# Patient Record
Sex: MALE | Race: WHITE | Employment: OTHER | URBAN - METROPOLITAN AREA
[De-identification: names, ages, dates, MRNs, and addresses within clinical notes are randomized per-mention and may not be internally consistent; named-entity substitution may affect disease eponyms.]

---

## 2022-08-01 ENCOUNTER — TRANSCRIBE ORDER (OUTPATIENT)
Dept: SCHEDULING | Age: 87
End: 2022-08-01

## 2022-08-01 DIAGNOSIS — R13.13 PHARYNGEAL DYSPHAGIA: Primary | ICD-10-CM

## 2022-08-05 ENCOUNTER — HOSPITAL ENCOUNTER (OUTPATIENT)
Dept: GENERAL RADIOLOGY | Age: 87
Discharge: HOME | End: 2022-08-05
Attending: FAMILY MEDICINE
Payer: MEDICARE

## 2022-08-05 DIAGNOSIS — R13.13 PHARYNGEAL DYSPHAGIA: ICD-10-CM

## 2022-08-05 PROCEDURE — 74230 X-RAY XM SWLNG FUNCJ C+: CPT

## 2022-08-05 PROCEDURE — 92611 MOTION FLUOROSCOPY/SWALLOW: CPT

## 2022-08-05 NOTE — PROGRESS NOTES
[facility]  [facility], [address] STUDY  Patient: Callie Davison (14 y.o. male)  Date: 8/5/2022  Referring Provider:  Zenobia Munguia DO    SUBJECTIVE:   Patient was accompanied by daughter who provided case history. Patient was hospitalized in [place] for 1 week for aspiration pneumonia. Instrumental swallowing evaluation reportedly not completed, however patient was put on moderately/honey thick liquids at bedside. Patient discharged to rehab and instrumental swallowing evaluation reportedly still not completed. [facility] advocated for [facility]BS. Patient eats soft solids with no difficulty per daughter reported. Takes medications in applesauce or thickened liquids. Daughter will give patient a small amount of thin water at night as he does not like thickened liquids, and he coughs a lot with thin liquids. PMH includes hiatal hernia, however patient generally healthy with last hospitalization 20+ years ago prior to aspiration pneumonia hospitalization. OBJECTIVE:   Past Medical History:   No past medical history on file. No past surgical history on file. Current Dietary Status:  soft solids with moderately/honey thick liquids  Radiologist: AALIYAH Hua  Film Views: Lateral;Fluoro  Patient Position: upright in hausted chair    Trial 1:   Consistency Presented: Thin liquid; Nectar thick liquid;Honey thick liquid;Puree; Solid   How Presented: Self-fed/presented;Cup/sip;Spoon       Bolus Acceptance: No impairment   Bolus Formation/Control: No impairment:     Propulsion: No impairment       Initiation of Swallow: Triggered at vallecula;Triggered at pyriform sinus(es)   Timing: Pooling 1-5 sec   Penetration: During swallow;From initial swallow;From residual   Aspiration/Timing: Repeated;During;From initial swallow;From residual;Other (comment) (silent vs audible)   Pharyngeal Clearance: Vallecular residue;Pyriform residue ;Greater than 50%   Attempted Modifications: Small sips and bites; Alternate liquids/solids;Cup/sip;Spoon   Effective Modifications: None   Cues for Modifications: Minimal           Decreased Tongue Base Retraction?: Yes  Laryngeal Elevation: Inadequate epiglottic inversion; Incomplete laryngeal closure; Reduced excursion with laryngeal vestibule gap  Aspiration/Penetration Score: 8 (Aspiration-Contrast passes cords/glottis with no effort to eject, ie/silent aspiration)  Pharyngeal Symmetry: Not assessed          Oral Phase Severity: Other (comment) (WFL)  Pharyngeal Phase Severity: Severe    The NOMS functional outcome measure was used to quantify this patient's level of swallowing impairment. Based on the NOMS, the patient was determined to be at level 2 for swallow function         NOMS Swallowing Levels:  Level 1 (CN): NPO  Level 2 (CM): NPO but takes consistency in therapy  Level 3 (CL): Takes less than 50% of nutrition p.o. and continues with nonoral feedings; and/or safe with mod cues; and/or max diet restriction  Level 4 (CK): Safe swallow but needs mod cues; and/or mod diet restriction; and/or still requires some nonoral feeding/supplements  Level 5 (CJ): Safe swallow with min diet restriction; and/or needs min cues  Level 6 (CI): Independent with p.o.; rare cues; usually self cues; may need to avoid some foods or needs extra time  Level 7 ([address] [place] [address]): Independent for all p.o.  PRISCILA. (2003). National Outcomes Measurement System (NOMS): Adult Speech-Language Pathology User's Guide. ASSESSMENT :  Based on the objective data described above, the patient presents with severe pharyngeal dysphagia.  Patient with reduced movement of all pharyngeal structures, including reduced tongue base retraction, epiglottic inversion, anterior hyoid excursion, laryngeal elevation/closure, and UES opening resulting in collection of vallecular and pyriform sinus residue with all liquids and max residue with puree and solid. Patient with aspiration of all consistencies, including thin liquids, mildly thick liquids, moderately thick liquids, and residue from both puree and solid, during the swallow secondary to reduced laryngeal elevation/closure or during subsequent swallows due to residue. Aspiration sometimes elicited spontaneous cough that was ineffective to clear aspirate, however aspiration was also intermittently silent. Provided education regarding results as above to patient's daughter after MBS completed. Daughter reported that patient does have an AMD that says no feeding tubes. Given patient does not want a feeding tube (which is appropriate for advanced age and guarded prognosis for recovery of swallow function) and aspirates all consistencies, would recommend patient STOP using thickener and drink thin liquids. Also recommended daughter follow up with patient's PCP as soon as possible. Called patient's PCP, Dr. Huertas, and patient's [facility] SLP, Edil Nicolas, to discuss MBS results. PLAN/RECOMMENDATIONS :  -PEG is not in line with patient's goals per daughter report of patient's AMD  -Thickener is still aspirated and thus not safer for patient, therefore recommend resume thin liquids, or whatever patient desires  -Additional education/discussion likely required regarding goals of care, quality of life, aspiration risk, oral care; defer to PCP and treating SLP     COMMUNICATION/EDUCATION:   The above findings and recommendations were discussed with:  patient's daughter  who verbalized understanding.     Thank you for this referral.  Kevan Guevara, SLP  Time Calculation: 28 mins

## 2022-11-09 ENCOUNTER — HOSPITAL ENCOUNTER (INPATIENT)
Age: 87
LOS: 1 days | Discharge: HOSPICE | DRG: 812 | End: 2022-11-10
Attending: EMERGENCY MEDICINE | Admitting: HOSPITALIST
Payer: MEDICARE

## 2022-11-09 ENCOUNTER — APPOINTMENT (OUTPATIENT)
Dept: ULTRASOUND IMAGING | Age: 87
DRG: 812 | End: 2022-11-09
Attending: HOSPITALIST
Payer: MEDICARE

## 2022-11-09 ENCOUNTER — APPOINTMENT (OUTPATIENT)
Dept: GENERAL RADIOLOGY | Age: 87
DRG: 812 | End: 2022-11-09
Attending: EMERGENCY MEDICINE
Payer: MEDICARE

## 2022-11-09 DIAGNOSIS — R05.1 ACUTE COUGH: ICD-10-CM

## 2022-11-09 DIAGNOSIS — T17.908A ASPIRATION INTO AIRWAY, INITIAL ENCOUNTER: ICD-10-CM

## 2022-11-09 DIAGNOSIS — D64.9 SYMPTOMATIC ANEMIA: Primary | ICD-10-CM

## 2022-11-09 DIAGNOSIS — K92.2 GASTROINTESTINAL HEMORRHAGE, UNSPECIFIED GASTROINTESTINAL HEMORRHAGE TYPE: ICD-10-CM

## 2022-11-09 DIAGNOSIS — N18.9 CHRONIC KIDNEY DISEASE, UNSPECIFIED CKD STAGE: ICD-10-CM

## 2022-11-09 PROBLEM — R79.89 ELEVATED SERUM CREATININE: Status: ACTIVE | Noted: 2022-11-09

## 2022-11-09 PROBLEM — R79.9 ELEVATED BUN: Status: ACTIVE | Noted: 2022-11-09

## 2022-11-09 PROBLEM — R06.00 DYSPNEA: Status: ACTIVE | Noted: 2022-11-09

## 2022-11-09 PROBLEM — N17.9 AKI (ACUTE KIDNEY INJURY) (HCC): Status: ACTIVE | Noted: 2022-11-09

## 2022-11-09 PROBLEM — R06.00 DYSPNEA: Status: RESOLVED | Noted: 2022-11-09 | Resolved: 2022-11-09

## 2022-11-09 PROBLEM — R53.1 GENERALIZED WEAKNESS: Status: ACTIVE | Noted: 2022-11-09

## 2022-11-09 PROBLEM — R06.89 ACUTE RESPIRATORY INSUFFICIENCY: Status: ACTIVE | Noted: 2022-11-09

## 2022-11-09 PROBLEM — R53.83 FATIGUE: Status: ACTIVE | Noted: 2022-11-09

## 2022-11-09 LAB
25(OH)D3 SERPL-MCNC: 83.2 NG/ML (ref 30–100)
ALBUMIN SERPL-MCNC: 3.3 G/DL (ref 3.5–5)
ALBUMIN/GLOB SERPL: 0.8 {RATIO} (ref 1.1–2.2)
ALP SERPL-CCNC: 79 U/L (ref 45–117)
ALT SERPL-CCNC: 18 U/L (ref 12–78)
ANION GAP SERPL CALC-SCNC: 13 MMOL/L (ref 5–15)
APTT PPP: 22.3 SEC (ref 22.1–31)
AST SERPL-CCNC: 17 U/L (ref 15–37)
BASOPHILS # BLD: 0 K/UL (ref 0–0.1)
BASOPHILS NFR BLD: 0 % (ref 0–1)
BILIRUB SERPL-MCNC: 0.3 MG/DL (ref 0.2–1)
BNP SERPL-MCNC: 742 PG/ML (ref 0–450)
BUN SERPL-MCNC: 111 MG/DL (ref 6–20)
BUN/CREAT SERPL: 50 (ref 12–20)
CALCIUM SERPL-MCNC: 8.6 MG/DL (ref 8.5–10.1)
CHLORIDE SERPL-SCNC: 105 MMOL/L (ref 97–108)
CO2 SERPL-SCNC: 21 MMOL/L (ref 21–32)
CREAT SERPL-MCNC: 2.2 MG/DL (ref 0.7–1.3)
DIFFERENTIAL METHOD BLD: ABNORMAL
EOSINOPHIL # BLD: 0 K/UL (ref 0–0.4)
EOSINOPHIL NFR BLD: 0 % (ref 0–7)
ERYTHROCYTE [DISTWIDTH] IN BLOOD BY AUTOMATED COUNT: 13.1 % (ref 11.5–14.5)
FERRITIN SERPL-MCNC: 63 NG/ML (ref 26–388)
FOLATE SERPL-MCNC: 22.4 NG/ML (ref 5–21)
GLOBULIN SER CALC-MCNC: 4.1 G/DL (ref 2–4)
GLUCOSE SERPL-MCNC: 132 MG/DL (ref 65–100)
HAPTOGLOB SERPL-MCNC: 153 MG/DL (ref 30–200)
HCT VFR BLD AUTO: 18 % (ref 36.6–50.3)
HEMOCCULT STL QL: POSITIVE
HGB BLD-MCNC: 5.9 G/DL (ref 12.1–17)
HISTORY CHECKED?,CKHIST: NORMAL
IMM GRANULOCYTES # BLD AUTO: 0.1 K/UL (ref 0–0.04)
IMM GRANULOCYTES NFR BLD AUTO: 1 % (ref 0–0.5)
INR PPP: 1 (ref 0.9–1.1)
IRON SATN MFR SERPL: 10 % (ref 20–50)
IRON SERPL-MCNC: 34 UG/DL (ref 35–150)
LACTATE SERPL-SCNC: 1.5 MMOL/L (ref 0.4–2)
LYMPHOCYTES # BLD: 1.2 K/UL (ref 0.8–3.5)
LYMPHOCYTES NFR BLD: 11 % (ref 12–49)
MAGNESIUM SERPL-MCNC: 2 MG/DL (ref 1.6–2.4)
MCH RBC QN AUTO: 33.5 PG (ref 26–34)
MCHC RBC AUTO-ENTMCNC: 32.8 G/DL (ref 30–36.5)
MCV RBC AUTO: 102.3 FL (ref 80–99)
MONOCYTES # BLD: 0.7 K/UL (ref 0–1)
MONOCYTES NFR BLD: 7 % (ref 5–13)
NEUTS SEG # BLD: 9 K/UL (ref 1.8–8)
NEUTS SEG NFR BLD: 82 % (ref 32–75)
NRBC # BLD: 0 K/UL (ref 0–0.01)
NRBC BLD-RTO: 0 PER 100 WBC
PHOSPHATE SERPL-MCNC: 4.4 MG/DL (ref 2.6–4.7)
PLATELET # BLD AUTO: 231 K/UL (ref 150–400)
PMV BLD AUTO: 11.6 FL (ref 8.9–12.9)
POTASSIUM SERPL-SCNC: 4.9 MMOL/L (ref 3.5–5.1)
PROCALCITONIN SERPL-MCNC: 0.12 NG/ML
PROT SERPL-MCNC: 7.4 G/DL (ref 6.4–8.2)
PROTHROMBIN TIME: 9.9 SEC (ref 9–11.1)
RBC # BLD AUTO: 1.76 M/UL (ref 4.1–5.7)
RETICS # AUTO: 0.05 M/UL (ref 0.03–0.1)
RETICS/RBC NFR AUTO: 3 % (ref 0.7–2.1)
SODIUM SERPL-SCNC: 139 MMOL/L (ref 136–145)
THERAPEUTIC RANGE,PTTT: NORMAL SECS (ref 58–77)
TIBC SERPL-MCNC: 325 UG/DL (ref 250–450)
TROPONIN-HIGH SENSITIVITY: 21 NG/L (ref 0–76)
VIT B12 SERPL-MCNC: 457 PG/ML (ref 193–986)
WBC # BLD AUTO: 11 K/UL (ref 4.1–11.1)

## 2022-11-09 PROCEDURE — 83540 ASSAY OF IRON: CPT

## 2022-11-09 PROCEDURE — 82607 VITAMIN B-12: CPT

## 2022-11-09 PROCEDURE — 76770 US EXAM ABDO BACK WALL COMP: CPT

## 2022-11-09 PROCEDURE — 36415 COLL VENOUS BLD VENIPUNCTURE: CPT

## 2022-11-09 PROCEDURE — 85025 COMPLETE CBC W/AUTO DIFF WBC: CPT

## 2022-11-09 PROCEDURE — 83880 ASSAY OF NATRIURETIC PEPTIDE: CPT

## 2022-11-09 PROCEDURE — 82272 OCCULT BLD FECES 1-3 TESTS: CPT

## 2022-11-09 PROCEDURE — 83010 ASSAY OF HAPTOGLOBIN QUANT: CPT

## 2022-11-09 PROCEDURE — 36430 TRANSFUSION BLD/BLD COMPNT: CPT

## 2022-11-09 PROCEDURE — 74011250636 HC RX REV CODE- 250/636: Performed by: HOSPITALIST

## 2022-11-09 PROCEDURE — 85610 PROTHROMBIN TIME: CPT

## 2022-11-09 PROCEDURE — 83970 ASSAY OF PARATHORMONE: CPT

## 2022-11-09 PROCEDURE — C9113 INJ PANTOPRAZOLE SODIUM, VIA: HCPCS | Performed by: HOSPITALIST

## 2022-11-09 PROCEDURE — 80053 COMPREHEN METABOLIC PANEL: CPT

## 2022-11-09 PROCEDURE — 94761 N-INVAS EAR/PLS OXIMETRY MLT: CPT

## 2022-11-09 PROCEDURE — 85018 HEMOGLOBIN: CPT

## 2022-11-09 PROCEDURE — 74011000250 HC RX REV CODE- 250: Performed by: HOSPITALIST

## 2022-11-09 PROCEDURE — 30233N1 TRANSFUSION OF NONAUTOLOGOUS RED BLOOD CELLS INTO PERIPHERAL VEIN, PERCUTANEOUS APPROACH: ICD-10-PCS | Performed by: INTERNAL MEDICINE

## 2022-11-09 PROCEDURE — 84484 ASSAY OF TROPONIN QUANT: CPT

## 2022-11-09 PROCEDURE — 82652 VIT D 1 25-DIHYDROXY: CPT

## 2022-11-09 PROCEDURE — 82306 VITAMIN D 25 HYDROXY: CPT

## 2022-11-09 PROCEDURE — 99285 EMERGENCY DEPT VISIT HI MDM: CPT

## 2022-11-09 PROCEDURE — P9016 RBC LEUKOCYTES REDUCED: HCPCS

## 2022-11-09 PROCEDURE — 82668 ASSAY OF ERYTHROPOIETIN: CPT

## 2022-11-09 PROCEDURE — 82746 ASSAY OF FOLIC ACID SERUM: CPT

## 2022-11-09 PROCEDURE — 93005 ELECTROCARDIOGRAM TRACING: CPT

## 2022-11-09 PROCEDURE — 83036 HEMOGLOBIN GLYCOSYLATED A1C: CPT

## 2022-11-09 PROCEDURE — 87040 BLOOD CULTURE FOR BACTERIA: CPT

## 2022-11-09 PROCEDURE — 83605 ASSAY OF LACTIC ACID: CPT

## 2022-11-09 PROCEDURE — 86900 BLOOD TYPING SEROLOGIC ABO: CPT

## 2022-11-09 PROCEDURE — 82728 ASSAY OF FERRITIN: CPT

## 2022-11-09 PROCEDURE — 71045 X-RAY EXAM CHEST 1 VIEW: CPT

## 2022-11-09 PROCEDURE — 65270000029 HC RM PRIVATE

## 2022-11-09 PROCEDURE — 86923 COMPATIBILITY TEST ELECTRIC: CPT

## 2022-11-09 PROCEDURE — 84145 PROCALCITONIN (PCT): CPT

## 2022-11-09 PROCEDURE — 84100 ASSAY OF PHOSPHORUS: CPT

## 2022-11-09 PROCEDURE — 83735 ASSAY OF MAGNESIUM: CPT

## 2022-11-09 PROCEDURE — 85045 AUTOMATED RETICULOCYTE COUNT: CPT

## 2022-11-09 PROCEDURE — 85730 THROMBOPLASTIN TIME PARTIAL: CPT

## 2022-11-09 RX ORDER — MIDODRINE HYDROCHLORIDE 10 MG/1
TABLET ORAL
COMMUNITY
Start: 2022-10-17 | End: 2022-11-10

## 2022-11-09 RX ORDER — ACETAMINOPHEN 325 MG/1
650 TABLET ORAL
Status: DISCONTINUED | OUTPATIENT
Start: 2022-11-09 | End: 2022-11-10 | Stop reason: HOSPADM

## 2022-11-09 RX ORDER — SODIUM CHLORIDE 0.9 % (FLUSH) 0.9 %
5-40 SYRINGE (ML) INJECTION AS NEEDED
Status: DISCONTINUED | OUTPATIENT
Start: 2022-11-09 | End: 2022-11-10 | Stop reason: HOSPADM

## 2022-11-09 RX ORDER — ONDANSETRON 2 MG/ML
4 INJECTION INTRAMUSCULAR; INTRAVENOUS
Status: DISCONTINUED | OUTPATIENT
Start: 2022-11-09 | End: 2022-11-10 | Stop reason: HOSPADM

## 2022-11-09 RX ORDER — ACETAMINOPHEN 650 MG/1
650 SUPPOSITORY RECTAL
Status: DISCONTINUED | OUTPATIENT
Start: 2022-11-09 | End: 2022-11-10 | Stop reason: HOSPADM

## 2022-11-09 RX ORDER — POLYETHYLENE GLYCOL 3350 17 G/17G
17 POWDER, FOR SOLUTION ORAL DAILY PRN
Status: DISCONTINUED | OUTPATIENT
Start: 2022-11-09 | End: 2022-11-10 | Stop reason: HOSPADM

## 2022-11-09 RX ORDER — SODIUM CHLORIDE 9 MG/ML
250 INJECTION, SOLUTION INTRAVENOUS AS NEEDED
Status: DISCONTINUED | OUTPATIENT
Start: 2022-11-09 | End: 2022-11-10 | Stop reason: HOSPADM

## 2022-11-09 RX ORDER — SODIUM CHLORIDE 0.9 % (FLUSH) 0.9 %
5-40 SYRINGE (ML) INJECTION EVERY 8 HOURS
Status: DISCONTINUED | OUTPATIENT
Start: 2022-11-09 | End: 2022-11-10 | Stop reason: HOSPADM

## 2022-11-09 RX ORDER — SODIUM CHLORIDE 9 MG/ML
100 INJECTION, SOLUTION INTRAVENOUS CONTINUOUS
Status: DISCONTINUED | OUTPATIENT
Start: 2022-11-09 | End: 2022-11-10 | Stop reason: HOSPADM

## 2022-11-09 RX ORDER — AMOXICILLIN AND CLAVULANATE POTASSIUM 875; 125 MG/1; MG/1
TABLET, FILM COATED ORAL
COMMUNITY
Start: 2022-10-31 | End: 2022-11-10

## 2022-11-09 RX ORDER — FACIAL-BODY WIPES
10 EACH TOPICAL DAILY PRN
Status: DISCONTINUED | OUTPATIENT
Start: 2022-11-09 | End: 2022-11-10 | Stop reason: HOSPADM

## 2022-11-09 RX ADMIN — SODIUM CHLORIDE 100 ML/HR: 9 INJECTION, SOLUTION INTRAVENOUS at 16:25

## 2022-11-09 RX ADMIN — SODIUM CHLORIDE, PRESERVATIVE FREE 40 MG: 5 INJECTION INTRAVENOUS at 20:33

## 2022-11-09 RX ADMIN — SODIUM CHLORIDE, PRESERVATIVE FREE 40 MG: 5 INJECTION INTRAVENOUS at 16:24

## 2022-11-09 RX ADMIN — Medication 10 ML: at 20:33

## 2022-11-09 RX ADMIN — Medication 10 ML: at 16:24

## 2022-11-09 NOTE — ED NOTES
TRANSFER - OUT REPORT:    Verbal report given to [facility] on Niels Mckenna  being transferred to [facility] ED for routine progression of care       Report consisted of patients Situation, Background, Assessment and   Recommendations(SBAR). Information from the following report(s) SBAR, ED Summary, MAR, Recent Results, and Cardiac Rhythm NSR  was reviewed with the receiving nurse. Lines:   Peripheral IV 11/09/22 Right Antecubital (Active)   Site Assessment Clean, dry, & intact 11/09/22 1017   Phlebitis Assessment 0 11/09/22 1017   Infiltration Assessment 0 11/09/22 1017   Dressing Status Clean, dry, & intact 11/09/22 1017        Opportunity for questions and clarification was provided.       Patient transported with:   Monitor

## 2022-11-09 NOTE — ED NOTES
Report to [facility] transport team. VSS. PT to [facility] ED at this time.

## 2022-11-09 NOTE — ED PROVIDER NOTES
80-year-old male presents from home via EMS with complaint of cough and shortness of breath. EMS states that the patient was diagnosed with pneumonia couple of months ago. He started feeling improved but reports ongoing fatigue and weakness. Over the past few days he has developed increasing cough and shortness of breath again. Family called 46 today stating that the patient appeared much weaker than he had been previously. Patient states \"there is nothing wrong\". He denies any significant past medical history stating no history of cardiac disease or chronic lung disease. He takes no daily medications. EMS states his vital signs are stable but they were unable to obtain an O2 sat so they placed him on nasal cannula oxygen. Patient is hard of hearing but denies any fever, chest pain, vomiting, diarrhea. Patient lives with family in a detached apartment next to the family home that he did recently moved into from [facility]. On review of EMR, patient had a swallow study done in August 2022. He was noted to have severe aspiration of all liquids including thin and thickened. Feeding tube was not in line with patient's goals of care and his advanced medical directive. Recommendation was to resume regular liquids or  whatever the patient desired. No past medical history on file. No past surgical history on file. No family history on file.     Social History     Socioeconomic History    Marital status:      Spouse name: Not on file    Number of children: Not on file    Years of education: Not on file    Highest education level: Not on file   Occupational History    Not on file   Tobacco Use    Smoking status: Not on file    Smokeless tobacco: Not on file   Substance and Sexual Activity    Alcohol use: Not on file    Drug use: Not on file    Sexual activity: Not on file   Other Topics Concern    Not on file   Social History Narrative    Not on file     Social Determinants of Health Financial Resource Strain: Not on file   Food Insecurity: Not on file   Transportation Needs: Not on file   Physical Activity: Not on file   Stress: Not on file   Social Connections: Not on file   Intimate Partner Violence: Not on file   Housing Stability: Not on file         ALLERGIES: Patient has no known allergies. Review of Systems   Constitutional:  Negative for diaphoresis and fever. HENT:  Negative for facial swelling. Eyes:  Negative for visual disturbance. Respiratory:  Positive for cough and shortness of breath. Cardiovascular:  Negative for chest pain. Gastrointestinal:  Negative for abdominal pain. Genitourinary:  Negative for dysuria. Musculoskeletal:  Negative for joint swelling. Skin:  Negative for rash. Neurological:  Negative for headaches. Hematological:  Negative for adenopathy. Psychiatric/Behavioral:  Negative for suicidal ideas. There were no vitals filed for this visit. Physical Exam  Vitals and nursing note reviewed. Constitutional:       General: He is not in acute distress. Appearance: He is well-developed. He is not ill-appearing. Comments: Patient appears thin and underweight. HENT:      Head: Normocephalic and atraumatic. Eyes:      Pupils: Pupils are equal, round, and reactive to light. Cardiovascular:      Rate and Rhythm: Normal rate. Pulmonary:      Effort: Pulmonary effort is normal. No tachypnea, accessory muscle usage or respiratory distress. Breath sounds: No decreased breath sounds, wheezing, rhonchi or rales. Abdominal:      General: There is no distension. Genitourinary:     Comments: Digital rectal exam shows black-colored stool consistent with melena. No red blood. Musculoskeletal:         General: Normal range of motion. Cervical back: Normal range of motion and neck supple. Comments: Pectus excavatum   Skin:     General: Skin is warm and dry.    Neurological:      Mental Status: He is alert and oriented to person, place, and time. MDM  Number of Diagnoses or Management Options  Acute cough  Aspiration into airway, initial encounter  Chronic kidney disease, unspecified CKD stage  Gastrointestinal hemorrhage, unspecified gastrointestinal hemorrhage type  Symptomatic anemia  Diagnosis management comments: Assessment: Patient here with reports of cough and shortness of breath. Some of the symptoms of been going on for at least the past couple of months. I am not totally clear on what has changed or what was different today. No family was available at the bedside however we expect them to come in shortly. Patient appears comfortable in no distress with reassuring vital signs. He does show a slight tachypnea but breath sounds are clear. O2 sats 98% on room air. This could represent pneumonia or viral infection. He could have an upper respiratory infection or bronchitis. Also considering cardiac etiology. Plan to obtain EKG, cardiac enzymes, blood cultures and lactate, chest x-ray. We will monitor the patient awaiting results of his testing. Amount and/or Complexity of Data Reviewed  Clinical lab tests: reviewed  Tests in the radiology section of CPT®: reviewed  Tests in the medicine section of CPT®: reviewed      ED Course as of 11/09/22 1001   Wed Nov 09, 2022   0958 EKG, 12 LEAD, INITIAL  ED EKG interpretation:  Rhythm: normal sinus rhythm. Rate (approx.): 92. Axis: normal.  ST segment:  No concerning ST elevations or depressions. RBBB. This EKG was interpreted by Preston Gordon MD,ED Provider. [JM]      ED Course User Index  [JM] Kobe Sylvester MD     11:59 AM  I had a conversation with the patient's daughter who is now at the bedside. Sounds like patient has had a precipitous decline in his health over the past few months.   She has noticed increasingly dark stool and the patient was incontinent of stool this morning because he was too weak to get out of bed to go to the toilet. We will perform a rectal exam to obtain a stool sample for occult blood testing. Plan to admit to [address] for blood transfusion and further evaluation. Daughter was in agreement with this plan. Perfect Serve Consult for Admission  12:00 PM    ED Room Number: WER04/04  Patient Name and age:  Yoel Pineda 80 y.o.  male  Working Diagnosis:   1. Symptomatic anemia    2. Gastrointestinal hemorrhage, unspecified gastrointestinal hemorrhage type    3. Acute cough    4. Aspiration into airway, initial encounter    5. Chronic kidney disease, unspecified CKD stage        COVID-19 Suspicion:  no  Sepsis present:  no  Reassessment needed: no  Code Status:  Full Code  Readmission: no  Isolation Requirements:  no  Recommended Level of Care:  med/surg  Department: [facility] ED - [phone]  Admitting Provider:     Other:  worsening weakness/fatigue. Hgb=5.9. Occult stool pending. Cr=2.2 with unknown baseline. Per daughter, has had precipitous decline in health over past few months. Total critical care time spent exclusive of procedures:  35 minutes.     Procedures

## 2022-11-09 NOTE — PROGRESS NOTES
[facility]: TRANSFER MED RECONCILIATION    Comments/Recommendations:     Appreciate med rec completed by nursing  PTA med list is consistent with Rx query    Information obtained from: nursing med rec, Rx query    Significant PMH/Disease States: History reviewed. No pertinent past medical history. Chief Complaint for this Admission:   Chief Complaint   Patient presents with    Shortness of Breath     Allergies: Patient has no known allergies. Prior to Admission Medications:     Medication Documentation Review Audit       Reviewed by Hyacinth Webber PHARMD (Pharmacist) on 11/09/22 at 1237      Medication Sig Documenting Provider Last Dose Status Taking?   amoxicillin-clavulanate (AUGMENTIN) 875-125 mg per tablet TAKE 1 TABLET ORALLY EVERY 12 HOURS FOR 10 DAYS Other, MD Shekhar  Active    midodrine (PROAMATINE) 10 mg tablet TAKE 1 TABLET BY MOUTH 3 TIMES DAILY Other, MD Shekhar  Active                 Thank you,  Sander DOLAN, [facility]

## 2022-11-09 NOTE — ED NOTES
Pt made aware of NPO status. Pt denies current needs/complaints. Medicated w/protonix and IVF infusing. Call bell in reach.

## 2022-11-09 NOTE — ED TRIAGE NOTES
Pt arrives to ED via EMS from home w/co cough/congestion/SOB/weakness x4 months, worsening significantly over last few weeks. Per EMS, pt was started on Amoxicillin for PNA last week by PCP. Family called 46 today as pt was much weaker today than he has been. EMS reports VSS en route, unable to obtain O2 sats.

## 2022-11-10 VITALS
BODY MASS INDEX: 16.42 KG/M2 | HEART RATE: 85 BPM | DIASTOLIC BLOOD PRESSURE: 71 MMHG | SYSTOLIC BLOOD PRESSURE: 136 MMHG | RESPIRATION RATE: 22 BRPM | OXYGEN SATURATION: 100 % | WEIGHT: 121.25 LBS | HEIGHT: 72 IN | TEMPERATURE: 97.5 F

## 2022-11-10 PROBLEM — R19.5 OCCULT BLOOD IN STOOLS: Status: ACTIVE | Noted: 2022-11-10

## 2022-11-10 PROBLEM — R79.89 ELEVATED SERUM CREATININE: Status: RESOLVED | Noted: 2022-11-09 | Resolved: 2022-11-10

## 2022-11-10 PROBLEM — R13.13 PHARYNGEAL DYSPHAGIA: Status: ACTIVE | Noted: 2022-11-10

## 2022-11-10 PROBLEM — R62.7 FTT (FAILURE TO THRIVE) IN ADULT: Status: ACTIVE | Noted: 2022-11-10

## 2022-11-10 LAB
ALBUMIN SERPL-MCNC: 3.1 G/DL (ref 3.5–5)
ALBUMIN/GLOB SERPL: 0.8 {RATIO} (ref 1.1–2.2)
ALP SERPL-CCNC: 76 U/L (ref 45–117)
ALT SERPL-CCNC: 12 U/L (ref 12–78)
ANION GAP SERPL CALC-SCNC: 6 MMOL/L (ref 5–15)
AST SERPL-CCNC: 17 U/L (ref 15–37)
ATRIAL RATE: 92 BPM
BASOPHILS # BLD: 0 K/UL (ref 0–0.1)
BASOPHILS NFR BLD: 1 % (ref 0–1)
BILIRUB SERPL-MCNC: 0.6 MG/DL (ref 0.2–1)
BUN SERPL-MCNC: 81 MG/DL (ref 6–20)
BUN/CREAT SERPL: 42 (ref 12–20)
CALCIUM SERPL-MCNC: 8.4 MG/DL (ref 8.5–10.1)
CALCIUM SERPL-MCNC: 9 MG/DL (ref 8.5–10.1)
CALCULATED P AXIS, ECG09: 54 DEGREES
CALCULATED R AXIS, ECG10: 17 DEGREES
CALCULATED T AXIS, ECG11: 12 DEGREES
CHLORIDE SERPL-SCNC: 115 MMOL/L (ref 97–108)
CO2 SERPL-SCNC: 22 MMOL/L (ref 21–32)
CREAT SERPL-MCNC: 1.94 MG/DL (ref 0.7–1.3)
DIAGNOSIS, 93000: NORMAL
DIFFERENTIAL METHOD BLD: ABNORMAL
EOSINOPHIL # BLD: 0 K/UL (ref 0–0.4)
EOSINOPHIL NFR BLD: 0 % (ref 0–7)
ERYTHROCYTE [DISTWIDTH] IN BLOOD BY AUTOMATED COUNT: 15.8 % (ref 11.5–14.5)
EST. AVERAGE GLUCOSE BLD GHB EST-MCNC: ABNORMAL MG/DL
GLOBULIN SER CALC-MCNC: 3.7 G/DL (ref 2–4)
GLUCOSE SERPL-MCNC: 142 MG/DL (ref 65–100)
HBA1C MFR BLD: <3.8 % (ref 4–5.6)
HCT VFR BLD AUTO: 17.5 % (ref 36.6–50.3)
HCT VFR BLD AUTO: 19 % (ref 36.6–50.3)
HCT VFR BLD AUTO: 24.7 % (ref 36.6–50.3)
HGB BLD-MCNC: 5.7 G/DL (ref 12.1–17)
HGB BLD-MCNC: 6.2 G/DL (ref 12.1–17)
HGB BLD-MCNC: 8 G/DL (ref 12.1–17)
HISTORY CHECKED?,CKHIST: NORMAL
HISTORY CHECKED?,CKHIST: NORMAL
IMM GRANULOCYTES # BLD AUTO: 0.1 K/UL (ref 0–0.04)
IMM GRANULOCYTES NFR BLD AUTO: 1 % (ref 0–0.5)
LYMPHOCYTES # BLD: 0.9 K/UL (ref 0.8–3.5)
LYMPHOCYTES NFR BLD: 12 % (ref 12–49)
MCH RBC QN AUTO: 31.3 PG (ref 26–34)
MCHC RBC AUTO-ENTMCNC: 32.4 G/DL (ref 30–36.5)
MCV RBC AUTO: 96.5 FL (ref 80–99)
MONOCYTES # BLD: 0.7 K/UL (ref 0–1)
MONOCYTES NFR BLD: 9 % (ref 5–13)
NEUTS SEG # BLD: 6.2 K/UL (ref 1.8–8)
NEUTS SEG NFR BLD: 78 % (ref 32–75)
NRBC # BLD: 0 K/UL (ref 0–0.01)
NRBC BLD-RTO: 0 PER 100 WBC
P-R INTERVAL, ECG05: 176 MS
PLATELET # BLD AUTO: 206 K/UL (ref 150–400)
PMV BLD AUTO: 11.2 FL (ref 8.9–12.9)
POTASSIUM SERPL-SCNC: 4.5 MMOL/L (ref 3.5–5.1)
PROT SERPL-MCNC: 6.8 G/DL (ref 6.4–8.2)
PTH-INTACT SERPL-MCNC: 45.5 PG/ML (ref 18.4–88)
Q-T INTERVAL, ECG07: 426 MS
QRS DURATION, ECG06: 146 MS
QTC CALCULATION (BEZET), ECG08: 526 MS
RBC # BLD AUTO: 2.56 M/UL (ref 4.1–5.7)
SODIUM SERPL-SCNC: 143 MMOL/L (ref 136–145)
VENTRICULAR RATE, ECG03: 92 BPM
WBC # BLD AUTO: 8 K/UL (ref 4.1–11.1)

## 2022-11-10 PROCEDURE — 85018 HEMOGLOBIN: CPT

## 2022-11-10 PROCEDURE — 36415 COLL VENOUS BLD VENIPUNCTURE: CPT

## 2022-11-10 PROCEDURE — 74011250636 HC RX REV CODE- 250/636: Performed by: HOSPITALIST

## 2022-11-10 PROCEDURE — 94761 N-INVAS EAR/PLS OXIMETRY MLT: CPT

## 2022-11-10 PROCEDURE — 74011000250 HC RX REV CODE- 250: Performed by: HOSPITALIST

## 2022-11-10 PROCEDURE — 85025 COMPLETE CBC W/AUTO DIFF WBC: CPT

## 2022-11-10 PROCEDURE — C9113 INJ PANTOPRAZOLE SODIUM, VIA: HCPCS | Performed by: HOSPITALIST

## 2022-11-10 PROCEDURE — 36430 TRANSFUSION BLD/BLD COMPNT: CPT

## 2022-11-10 PROCEDURE — P9016 RBC LEUKOCYTES REDUCED: HCPCS

## 2022-11-10 PROCEDURE — 80053 COMPREHEN METABOLIC PANEL: CPT

## 2022-11-10 RX ORDER — PANTOPRAZOLE SODIUM 40 MG/1
40 TABLET, DELAYED RELEASE ORAL
Qty: 30 TABLET | Refills: 0 | Status: SHIPPED | OUTPATIENT
Start: 2022-11-11 | End: 2022-12-11

## 2022-11-10 RX ORDER — PANTOPRAZOLE SODIUM 40 MG/1
40 TABLET, DELAYED RELEASE ORAL
Status: DISCONTINUED | OUTPATIENT
Start: 2022-11-11 | End: 2022-11-10 | Stop reason: HOSPADM

## 2022-11-10 RX ORDER — SODIUM CHLORIDE 9 MG/ML
250 INJECTION, SOLUTION INTRAVENOUS AS NEEDED
Status: DISCONTINUED | OUTPATIENT
Start: 2022-11-10 | End: 2022-11-10 | Stop reason: HOSPADM

## 2022-11-10 RX ORDER — LANOLIN ALCOHOL/MO/W.PET/CERES
325 CREAM (GRAM) TOPICAL
Qty: 30 TABLET | Refills: 0 | Status: SHIPPED | OUTPATIENT
Start: 2022-11-10 | End: 2022-12-10

## 2022-11-10 RX ADMIN — SODIUM CHLORIDE, PRESERVATIVE FREE 40 MG: 5 INJECTION INTRAVENOUS at 08:53

## 2022-11-10 RX ADMIN — Medication 10 ML: at 06:00

## 2022-11-10 RX ADMIN — Medication 10 ML: at 08:53

## 2022-11-10 RX ADMIN — SODIUM CHLORIDE 100 ML/HR: 9 INJECTION, SOLUTION INTRAVENOUS at 04:53

## 2022-11-10 NOTE — PROGRESS NOTES
I have discussed with the patient's daughter Ms Jennifer Sue the rationale for blood transfusion, its benefits in treating or preventing symptomatic anemia which could lead to fatigue, organ damage or death, and its risk which include: mild transfusion reactions, rare risk of blood borne infection, or more serious but rare allergic reactions. I have discussed the alternatives to transfusion, including the risk and consequences of not receiving transfusion. The patient had an opportunity to ask questions and had agreed to proceed with transfusion of packed red blood cells.

## 2022-11-10 NOTE — PROGRESS NOTES
Nutrition Note    MST received for unsure wt loss. No wt hx in chart. Noted SLP recs for regular diet for comfort as pt family is discussing hospice. Will follow as needed. Consult if nutrition needs arise.      Wt Readings from Last 30 Encounters:   11/09/22 55 kg (121 lb 4.1 oz)          Electronically signed by Andrew Coles RD on 11/10/2022 at 12:04 PM    Contact: [phone]

## 2022-11-10 NOTE — H&P
Hospitalist Admission Note    NAME:  Abigail Mcnamara   :  3/26/1924   MRN:  [id number]     Date/Time:  2022 11:31 PM    Patient PCP: Olegario Guzman DO  ________________________________________________________________________    Given the patient's current clinical presentation, I have a high level of concern for decompensation if discharged from the emergency department. Complex decision making was performed, which includes reviewing the patient's available past medical records, laboratory results, and x-ray films. My assessment of this patient's clinical condition and my plan of care is as follows. Assessment / Plan:    Anemia:    The patient is unable to give any details as to why he is in the hospital    VSS  WBC 11, Hg 5.9  , Cr 2.20  +stool occult    Admit and cont to monitor  ER is transfusing 1 unit PRBC  NPO, IVF, PPI bid, trend H/H  Consult GI in AM    Body mass index is 16.44 kg/m².:  less than 18.5:  Underweight    I have personally reviewed the radiographs, laboratory data in Epic and decisions and statements above are based partially on this personal interpretation. Code Status: Full Code   Surrogate Decision Maker  Unable to obtain d/t patient being a poor historian    Prophylaxis:  SCD's     Subjective:   CHIEF COMPLAINT: Unable to obtain     HISTORY OF PRESENT ILLNESS:       The patient is a 79 y/o C F w/ no significant PMH who is brought in via EMS as his family reported increasing generalized weakness. On my exam he is very hard of hearing and cannot give any details. There is no family at the bedside. He denies any chest pain, palpitations, coughing, wheezing or dyspnea. He has no fever, chills or night sweats. He has no abdominal pain, nausea, vomiting, constipation, diarrhea, melena or hematochezia. He is a poor historian. On chart review he has pharyngeal dysphagia (22). History reviewed. No pertinent past medical history. History reviewed. No pertinent surgical history. Social History     Tobacco Use    Smoking status: Never    Smokeless tobacco: Never   Substance Use Topics    Alcohol use: Not on file      FH:    Unable to obtain as patient is a poor historian    No Known Allergies     Prior to Admission medications    Medication Sig Start Date End Date Taking? Authorizing Provider   amoxicillin-clavulanate (AUGMENTIN) 875-125 mg per tablet TAKE 1 TABLET ORALLY EVERY 12 HOURS FOR 10 DAYS 10/31/22   Other, MD Shekhar   midodrine (PROAMATINE) 10 mg tablet TAKE 1 TABLET BY MOUTH 3 TIMES DAILY 10/17/22   Other, MD Shekhar       REVIEW OF SYSTEMS:  See HPI for details  General: negative for fever, chills, sweats, weakness, weight loss  Eyes: negative for blurred vision, eye pain, loss of vision, diplopia  Ear Nose and Throat: negative for rhinorrhea, pharyngitis, otalgia, tinnitus, speech or swallowing difficulties  Respiratory:  negative for pleuritic pain, cough, sputum production, wheezing, SOB, SALTER  Cardiology:  negative for chest pain, palpitations, orthopnea, PND, edema, syncope   Gastrointestinal: negative for abdominal pain, N/V, dysphagia, change in bowel habits, bleeding  Genitourinary: negative for frequency, urgency, dysuria, hematuria, incontinence  Muskuloskeletal : negative for arthralgia, myalgia  Hematology: negative for easy bruising, bleeding, lymphadenopathy  Dermatological: negative for rash, ulceration, mole change, new lesion  Endocrine: negative for hot flashes or polydipsia  Neurological: negative for headache, dizziness, confusion, focal weakness, paresthesia, memory loss, gait disturbance  Psychological: negative for anxiety, depression, agitation    Accuray of ROS is questionable as the patient is a poor historian.     Objective:   VITALS:    Visit Vitals  /62   Pulse 93   Temp 97.3 °F (36.3 °C)   Resp 22   Ht 6' (1.829 m)   Wt 55 kg (121 lb 4.1 oz)   SpO2 100%   BMI 16.44 kg/m²     PHYSICAL EXAM:    Physical Exam:    Gen: +frail, in no acute distress  HEENT:  Pink conjunctivae, PERRL, hearing intact to voice, moist mucous membranes  Neck: Supple, without masses, thyroid non-tender  Resp: No accessory muscle use, clear breath sounds without wheezes rales or rhonchi  Card: No murmurs, normal S1, S2 without thrills, bruits or peripheral edema  Abd:  Soft, non-tender, non-distended, normoactive bowel sounds are present, no palpable organomegaly and no detectable hernias  Lymph:  No cervical or inguinal adenopathy  Musc: No cyanosis or clubbing  Skin: No rashes or ulcers, skin turgor is good  Neuro:  Cranial nerves are grossly intact, no focal motor weakness,  Psych:  Very hard of hearing, not oriented to place and time, alert  _______________________________________________________________________  Care Plan discussed with:  Pt's condition, Imaging findings, Lab findings, Assessment, and Care Plan discussed with: Patient  _______________________________________________________________________    Probable disposition:  Home with family  ________________________________________________________________________    Comments   >50% of visit spent in counseling and coordination of care  Chart reviewed  Discussion with patient and/or family and questions answered     ________________________________________________________________________  Signed: Samie Mohs, MD        Procedures: see electronic medical records for all procedures/Xrays and details which were not copied into this note but were reviewed prior to creation of Plan.     LAB DATA REVIEWED:    Recent Results (from the past 24 hour(s))   EKG, 12 LEAD, INITIAL    Collection Time: 11/09/22  9:51 AM   Result Value Ref Range    Ventricular Rate 92 BPM    Atrial Rate 92 BPM    P-R Interval 176 ms    QRS Duration 146 ms    Q-T Interval 426 ms    QTC Calculation (Bezet) 526 ms    Calculated P Axis 54 degrees    Calculated R Axis 17 degrees    Calculated T Axis 12 degrees Diagnosis       Normal sinus rhythm  Right bundle branch block  Abnormal ECG  No previous ECGs available     CBC WITH AUTOMATED DIFF    Collection Time: 11/09/22  9:52 AM   Result Value Ref Range    WBC 11.0 4.1 - 11.1 K/uL    RBC 1.76 (L) 4.10 - 5.70 M/uL    HGB 5.9 (LL) 12.1 - 17.0 g/dL    HCT 18.0 (L) 36.6 - 50.3 %    .3 (H) 80.0 - 99.0 FL    MCH 33.5 26.0 - 34.0 PG    MCHC 32.8 30.0 - 36.5 g/dL    RDW 13.1 11.5 - 14.5 %    PLATELET 374 386 - 781 K/uL    MPV 11.6 8.9 - 12.9 FL    NRBC 0.0 0.0  WBC    ABSOLUTE NRBC 0.00 0.00 - 0.01 K/uL    NEUTROPHILS 82 (H) 32 - 75 %    LYMPHOCYTES 11 (L) 12 - 49 %    MONOCYTES 7 5 - 13 %    EOSINOPHILS 0 0 - 7 %    BASOPHILS 0 0 - 1 %    IMMATURE GRANULOCYTES 1 (H) 0 - 0.5 %    ABS. NEUTROPHILS 9.0 (H) 1.8 - 8.0 K/UL    ABS. LYMPHOCYTES 1.2 0.8 - 3.5 K/UL    ABS. MONOCYTES 0.7 0.0 - 1.0 K/UL    ABS. EOSINOPHILS 0.0 0.0 - 0.4 K/UL    ABS. BASOPHILS 0.0 0.0 - 0.1 K/UL    ABS. IMM. GRANS. 0.1 (H) 0.00 - 0.04 K/UL    DF AUTOMATED     METABOLIC PANEL, COMPREHENSIVE    Collection Time: 11/09/22  9:52 AM   Result Value Ref Range    Sodium 139 136 - 145 mmol/L    Potassium 4.9 3.5 - 5.1 mmol/L    Chloride 105 97 - 108 mmol/L    CO2 21 21 - 32 mmol/L    Anion gap 13 5 - 15 mmol/L    Glucose 132 (H) 65 - 100 mg/dL     (H) 6 - 20 MG/DL    Creatinine 2.20 (H) 0.70 - 1.30 MG/DL    BUN/Creatinine ratio 50 (H) 12 - 20      eGFR 26 (L) >60 ml/min/1.73m2    Calcium 8.6 8.5 - 10.1 MG/DL    Bilirubin, total 0.3 0.2 - 1.0 MG/DL    ALT (SGPT) 18 12 - 78 U/L    AST (SGOT) 17 15 - 37 U/L    Alk.  phosphatase 79 45 - 117 U/L    Protein, total 7.4 6.4 - 8.2 g/dL    Albumin 3.3 (L) 3.5 - 5.0 g/dL    Globulin 4.1 (H) 2.0 - 4.0 g/dL    A-G Ratio 0.8 (L) 1.1 - 2.2     NT-PRO BNP    Collection Time: 11/09/22  9:52 AM   Result Value Ref Range    NT pro- (H) 0 - 450 PG/ML   TROPONIN-HIGH SENSITIVITY    Collection Time: 11/09/22  9:52 AM   Result Value Ref Range    Troponin-High Sensitivity 21 0 - 76 ng/L   MAGNESIUM    Collection Time: 11/09/22  9:52 AM   Result Value Ref Range    Magnesium 2.0 1.6 - 2.4 mg/dL   PROCALCITONIN    Collection Time: 11/09/22  9:52 AM   Result Value Ref Range    Procalcitonin 0.12 ng/mL   LACTIC ACID    Collection Time: 11/09/22  9:52 AM   Result Value Ref Range    Lactic acid 1.5 0.4 - 2.0 MMOL/L   PROTHROMBIN TIME + INR    Collection Time: 11/09/22 11:32 AM   Result Value Ref Range    INR 1.0 0.9 - 1.1      Prothrombin time 9.9 9.0 - 11.1 sec   OCCULT BLOOD, STOOL    Collection Time: 11/09/22 12:25 PM   Result Value Ref Range    Occult blood, stool Positive     TYPE & SCREEN    Collection Time: 11/09/22 12:25 PM   Result Value Ref Range    Crossmatch Expiration 11/12/2022,2359     ABO/Rh(D) AB POSITIVE     Antibody screen NEG     Unit number [id number]     Blood component type RC LR     Unit division 00     Status of unit ISSUED     Crossmatch result Compatible    PTH INTACT    Collection Time: 11/09/22  1:25 PM   Result Value Ref Range    Calcium 9.0 8.5 - 10.1 MG/DL    PTH, Intact PENDING pg/mL   PHOSPHORUS    Collection Time: 11/09/22  1:25 PM   Result Value Ref Range    Phosphorus 4.4 2.6 - 4.7 MG/DL   VITAMIN D, 25 HYDROXY    Collection Time: 11/09/22  1:25 PM   Result Value Ref Range    Vitamin D 25-Hydroxy 83.2 30 - 100 ng/mL   HAPTOGLOBIN    Collection Time: 11/09/22  1:25 PM   Result Value Ref Range    Haptoglobin 153 30 - 200 mg/dL   IRON PROFILE    Collection Time: 11/09/22  1:25 PM   Result Value Ref Range    Iron 34 (L) 35 - 150 ug/dL    TIBC 325 250 - 450 ug/dL    Iron % saturation 10 (L) 20 - 50 %   VITAMIN B12    Collection Time: 11/09/22  1:25 PM   Result Value Ref Range    Vitamin B12 457 193 - 986 pg/mL   RETICULOCYTE COUNT    Collection Time: 11/09/22  1:25 PM   Result Value Ref Range    Reticulocyte count 3.0 (H) 0.7 - 2.1 %    Absolute Retic Cnt. 0.0521 0.0260 - 0.0950 M/ul   FOLATE    Collection Time: 11/09/22  1:25 PM   Result Value Ref Range    Folate 22.4 (H) 5.0 - 21.0 ng/mL   FERRITIN    Collection Time: 11/09/22  1:25 PM   Result Value Ref Range    Ferritin 63 26 - 388 NG/ML   PTT    Collection Time: 11/09/22  1:25 PM   Result Value Ref Range    aPTT 22.3 22.1 - 31.0 sec    aPTT, therapeutic range     58.0 - 77.0 SECS   HGB & HCT    Collection Time: 11/09/22  1:25 PM   Result Value Ref Range    HGB 5.7 (LL) 12.1 - 17.0 g/dL    HCT 17.5 (LL) 36.6 - 50.3 %   RBC, ALLOCATE    Collection Time: 11/09/22 11:15 PM   Result Value Ref Range    HISTORY CHECKED?  Historical check performed

## 2022-11-10 NOTE — ACP (ADVANCE CARE PLANNING)
Jamie Liao [facility]                         [address] [facility], [address] [place] [address]  [phone]                                                                            Advance Care Planning Note    Name:                 Alejandro Reyes  YOB: 1924  MRN:                  [id number]  Admission Date: 11/9/2022    Date of service: 11/10/2022    Active Diagnoses:    Principal Problem:    Symptomatic anemia (11/9/2022)    FTT (failure to thrive) in adult (11/10/2022)    Pharyngeal dysphagia (11/10/2022)    Generalized weakness (11/9/2022)    ALVAREZ (acute kidney injury) ([facility] [address] (11/9/2022)    Occult blood in stools (11/10/2022)    These active diagnoses are of sufficient risk that focused discussion on advance care planning is indicated in order to allow the patient to thoughtfully consider personal goals of care, and if situations arise that prevent the ability to personally give input, to ensure appropriate representation of their personal desires for different levels and aggressiveness of care. Discussion:     Persons present and participating in discussion: Gianna Franz MD, Ms Vanessa Chari    Discussion: Patient admitted with symptomatic anemia with no obvious source. Discussed with his daughter who also indicated that he has severe dysphagia and has not been eating well in recent days. Advised her that given his anemia, we would routinely try establish the etiology which includes endoscopic testing. She confirmed to me that she would not think he would want to undergo any invasive testing. She also confirmed that he is a DNR. Offered hospice care to which she was receptive. CM to arrange for an information session. Time Spent:     Total time spent face-to-face in education and discussion: 16 minutes.      Gianna Franz MD  11/10/2022  12:59 PM

## 2022-11-10 NOTE — DISCHARGE SUMMARY
Jamie Liao [facility]  [address]  Tel: [phone] Medicine Discharge Summary    Patient ID:    Marie Foley  Age:              80 y.o.    : 3/26/1924  MRN:             [id number]     PCP: Loren Hare DO     Date of Admission: 2022    Date of Discharge:  11/10/2022    Principal admission Diagnosis:   Symptomatic anemia [D64.9]  Dyspnea [R06.00]  Generalized weakness [R53.1]    Discharge Diagnoses:  Principal Problem:    Symptomatic anemia (2022)    Occult blood in stools (11/10/2022)    Pharyngeal dysphagia (11/10/2022)    FTT (failure to thrive) in adult (11/10/2022)    Generalized weakness (2022)    ALVAREZ (acute kidney injury) ([facility] [address] (2022)    Hospital Course:     Mr. Natalio Winkler is a 80 y.o. admitted to [facility] and treated for the following:    Symptomatic anemia (2022) POA: severe on admission with a Hgb of 5.9. Possibly from chronic Gi blood loss given a positive stool for occult blood. Transfused 2u PRBCs and Hgb now stable at 8. Given he remained stable, he was discharged home with hospice as per daughter's request. Start oral iron     Occult blood in stools (11/10/2022) POA: concerning for an occult Gi bleed. Daughter says he has a hx of a hiatal hernia. Would not want any invasive procedures. No further testing. Start PPi     Pharyngeal dysphagia (11/10/2022) / FTT POA: noted on a recent swallow evaluation. Patient does NOT want a feeding tube. Continue thin liquids as tolerated. Home with hospice     Generalized weakness (2022) POA: likely from the severe anemia. Check a rapid CoV-19 given she had some cough     ALVAREZ (acute kidney injury) ([address] (2022) POA: unknown baseline SCr.  Oral hydration as tolerated    Discharge Exam:  Visit Vitals  /71 (BP 1 Location: Left upper arm, BP Patient Position: At rest;Sitting)   Pulse 85   Temp 97.5 °F (36.4 °C) Resp 22   Ht 6' (1.829 m)   Wt 55 kg (121 lb 4.1 oz)   SpO2 100%   BMI 16.44 kg/m²        Patient has been seen and examined. General: weak and cachectic looking, not in distress   Pulm: clear breath sounds without wheezes  Card: no murmurs, normal S1, S2 without thrills, bruits   Abd:    soft, non-tender, normoactive bowel sounds  Skin: no rashes and skin turgor is good  Neuro: awake, alert and has a non focal     Activity: Activity as tolerated    Diet: Comfort feeding    Current Discharge Medication List        START taking these medications    Details   pantoprazole (PROTONIX) 40 mg tablet Take 1 Tablet by mouth Daily (before breakfast) for 30 days. Qty: 30 Tablet, Refills: 0      ferrous sulfate 325 mg (65 mg iron) tablet Take 1 Tablet by mouth Daily (before breakfast) for 30 days. Qty: 30 Tablet, Refills: 0           STOP taking these medications       amoxicillin-clavulanate (AUGMENTIN) 875-125 mg per tablet Comments:   Reason for Stopping:         midodrine (PROAMATINE) 10 mg tablet Comments:   Reason for Stopping:               Dennis Echeverria DO  [address] [facility] [address]  [phone]            Follow-up tests or labs: None    Discharge Condition: Stable    Disposition: home with hospice    Time taken to co-ordinate and arrange discharge:  35 minutes.     Signed:  Awilda Carty MD       11/10/2022   1:14 PM

## 2022-11-10 NOTE — PROGRESS NOTES
Informed of anemic patient admitted with GI bleed and no blood consent on file. Reviewed electronic chart; hemoglobin consistently below 7.0, and thus required PRBC transfusion. Unfortunately, RN reports patient very confused and hard of hearing - consent would not be obtainable. Patient lives alone; next of kin on file daughter Gavi Michelle called, but no answer. I will be consenting patient emergently for PRBC transfusion. Will order 1 unit PRBC.

## 2022-11-10 NOTE — PROGRESS NOTES
CM follow up:    Call placed to patient's daughter Rosa Sims at [phone]. Voice message left requesting return call to discuss DC plans and possible home Hospice care. Await return call. Call received from patient's daughter Andry Rosales. Patient is currently receiving home health services from [facility] (SN, PT, OT). Discussed possible home Hospice care and she would like a Hospice information session. [address] with [facility], she will contact patient's daughter to discuss Home hospice options. 1400 - order received for home Hospice. Referral sent to [facility] via Ruchi Bain. Patient to discharge home today. Referral sent to [facility] for ambulance stretcher transport, 4pm  requested. 1405 - [facility] confirmed transport with  at 4pm today. [facility] form, face sheet, and H&P attached to Ruchi Bain referral and placed on front of chart.     Shannon Chris RN, MSN/Care manager  [phone]

## 2022-11-10 NOTE — ROUTINE PROCESS
SLP received swallow evaluation order. Noted MBS report from [facility] 2022:    ASSESSMENT :  Based on the objective data described above, the patient presents with severe pharyngeal dysphagia. Patient with reduced movement of all pharyngeal structures, including reduced tongue base retraction, epiglottic inversion, anterior hyoid excursion, laryngeal elevation/closure, and UES opening resulting in collection of vallecular and pyriform sinus residue with all liquids and max residue with puree and solid. Patient with aspiration of all consistencies, including thin liquids, mildly thick liquids, moderately thick liquids, and residue from both puree and solid, during the swallow secondary to reduced laryngeal elevation/closure or during subsequent swallows due to residue. Aspiration sometimes elicited spontaneous cough that was ineffective to clear aspirate, however aspiration was also intermittently silent. Provided education regarding results as above to patient's daughter after MBS completed. Daughter reported that patient does have an AMD that says no feeding tubes. Given patient does not want a feeding tube (which is appropriate for advanced age and guarded prognosis for recovery of swallow function) and aspirates all consistencies, would recommend patient STOP using thickener and drink thin liquids. Also recommended daughter follow up with patient's PCP as soon as possible. Called patient's PCP, Dr. Jamison Nelson, and patient's [facility] SLP, Bc Adrian, to discuss MBS results. PLAN/RECOMMENDATIONS :  -PEG is not in line with patient's goals per daughter report of patient's AMD  -Thickener is still aspirated and thus not safer for patient, therefore recommend resume thin liquids, or whatever patient desires  -Additional education/discussion likely required regarding goals of care, quality of life, aspiration risk, oral care; defer to PCP and treating SLP       Today, SLP discussed events post MBS with daughter.  She reported that they are letting the patient eat   What he wants for comfort. He eats pancakes, chicken chopped, soft meats. Started regular diet for comfort. Swallowing evaluation deferred at this time.

## 2022-11-10 NOTE — PROGRESS NOTES
TRANSFER - OUT REPORT:    Verbal report given to PCC(name) on Brittany Pineda  being transferred to PCC(unit) for routine progression of care       Report consisted of patients Situation, Background, Assessment and   Recommendations(SBAR). Information from the following report(s) SBAR, Kardex, ED Summary, Procedure Summary, Intake/Output, and MAR was reviewed with the receiving nurse. Lines:   Peripheral IV 11/09/22 Right Antecubital (Active)   Site Assessment Clean, dry, & intact 11/09/22 2003   Phlebitis Assessment 0 11/09/22 1017   Infiltration Assessment 0 11/09/22 1017   Dressing Status Clean, dry, & intact 11/09/22 1017        Opportunity for questions and clarification was provided.       Patient transported with:   Monitor

## 2022-11-10 NOTE — PROGRESS NOTES
0730 Bedside and Verbal shift change report given to JOSSY ACOSTA  (oncoming nurse) by Yen Mathew RN  (offgoing nurse). Report included the following information SBAR, Kardex, Intake/Output, MAR, Accordion, Recent Results, Med Rec Status, and Cardiac Rhythm NSR . This patient was assisted with Intentional Toileting every 2 hours during this shift as appropriate. Documentation of ambulation and output reflected on Flowsheet as appropriate. Purposeful hourly rounding was completed using AIDET and 5Ps. Outcomes of PHR documented as they occurred. Bed alarm in use as appropriate. Dual Suction and ambubag in place. 9927 Reached out to MD to inquire about current orders. Per MD, can wait to draw BMP with the 2hr-post blood transfusion HGB+HCT. Also per MD, do not give 3rd unit of blood immediately post 2nd unit; await results of 2hr-post HGB+HCT. 8860 I have reviewed discharge instructions with the patient and daughter. The patient and daughter verbalized understanding. [address] [facility] transporting patient home. Discharge instructions given to [facility] to hand to patient's daughter Rose Mariebillie Eduardos. Patient sent with all personal belongings.

## 2022-11-13 LAB
ABO + RH BLD: NORMAL
BLD PROD TYP BPU: NORMAL
BLOOD GROUP ANTIBODIES SERPL: NORMAL
BPU ID: NORMAL
CROSSMATCH RESULT,%XM: NORMAL
SPECIMEN EXP DATE BLD: NORMAL
STATUS OF UNIT,%ST: NORMAL
UNIT DIVISION, %UDIV: 0

## 2022-11-15 LAB
BACTERIA SPEC CULT: NORMAL
SERVICE CMNT-IMP: NORMAL

## 2023-01-13 NOTE — DISCHARGE INSTRUCTIONS
Department of Anesthesiology  Postprocedure Note    Patient: Angelica Burnette  MRN: [id number]  YOB: 1983  Date of evaluation: 1/13/2023      Procedure Summary     Date: 01/13/23 Room / Location: [facility] OR 01 / [facility]    Anesthesia Start: 1411 Anesthesia Stop:     Procedure: COLONOSCOPY DIAGNOSTIC Diagnosis:       Rectal bleeding      (Rectal bleeding [K62.5])    Surgeons: Emily Gordon MD Responsible Provider: SANTA Gutierrez CRNA    Anesthesia Type: MAC ASA Status: 3          Anesthesia Type: No value filed.     Josi Phase I: Josi Score: 10    Josi Phase II:        Anesthesia Post Evaluation    Patient location during evaluation: bedside  Patient participation: complete - patient participated  Level of consciousness: awake  Airway patency: patent  Nausea & Vomiting: no nausea and no vomiting  Complications: no  Cardiovascular status: blood pressure returned to baseline  Respiratory status: acceptable  Hydration status: euvolemic [facility] DISCHARGE INSTRUCTIONS    NAME: William Menard   :  3/26/1924   MRN:  [id number]     Date:     11/10/2022    ADMIT DATE: 2022     DISCHARGE DATE: 11/10/2022     PRINCIPAL ADMITTING DIAGNOSIS:  Symptomatic anemia [D64.9]  Dyspnea [R06.00]  Generalized weakness [R53.1]    DISCHARGE DIAGNOSES:  Principal Problem:    Symptomatic anemia (2022)    Generalized weakness (2022)    ALVAREZ (acute kidney injury) ([facility] [address] (2022)    Occult blood in stools (11/10/2022)    Pharyngeal dysphagia (11/10/2022)    FTT (failure to thrive) in adult (11/10/2022)    MEDICATIONS:    It is important that medications are taken exactly as they are prescribed on the discharge medication instructions and keep them your  in the bottles provided by the pharmacist.   Keep a list of the medication names, dosages, and times to be taken at all times. Do not take other medications without consulting your doctor. Recommended diet:  Resume previous diet    Recommended activity: Activity as tolerated    Post discharge care:    Notify follow up health care provider or return to the emergency department if you cannot get hold of your doctor if you feel worse or experience symptoms similar to those that brought you to hospital    Terrence Ledezma Sender,   [address] [place] [address]  [phone]             Information obtained by :  I understand that if any problems occur once I am at home I am to contact my physician and I understand and acknowledge receipt of the instructions indicated above.                                                                                                                                            Physician's or R.N.'s Signature                                                                  Date/Time                                                                                                                                              Patient or Representative Signature                                                          Date/Time